# Patient Record
Sex: MALE | URBAN - METROPOLITAN AREA
[De-identification: names, ages, dates, MRNs, and addresses within clinical notes are randomized per-mention and may not be internally consistent; named-entity substitution may affect disease eponyms.]

---

## 2021-11-11 ENCOUNTER — OUTPATIENT (OUTPATIENT)
Dept: OUTPATIENT SERVICES | Facility: HOSPITAL | Age: 46
LOS: 1 days | End: 2021-11-11

## 2021-11-11 ENCOUNTER — APPOINTMENT (OUTPATIENT)
Age: 46
End: 2021-11-11
Payer: SELF-PAY

## 2021-11-11 VITALS
OXYGEN SATURATION: 97 % | DIASTOLIC BLOOD PRESSURE: 81 MMHG | TEMPERATURE: 98.6 F | HEIGHT: 66 IN | RESPIRATION RATE: 17 BRPM | BODY MASS INDEX: 28.93 KG/M2 | SYSTOLIC BLOOD PRESSURE: 126 MMHG | WEIGHT: 180 LBS | HEART RATE: 83 BPM

## 2021-11-11 DIAGNOSIS — Z00.00 ENCOUNTER FOR GENERAL ADULT MEDICAL EXAMINATION W/OUT ABNORMAL FINDINGS: ICD-10-CM

## 2021-11-11 PROCEDURE — 99203 OFFICE O/P NEW LOW 30 MIN: CPT

## 2021-11-11 NOTE — PLAN
[FreeTextEntry1] : 45 y o m with no significant pmhx, comes in for a routine check up.\par Labs today including HIV and Hep C\par Healthy eating, exercise, safe sex.

## 2021-11-11 NOTE — HEALTH RISK ASSESSMENT
[Very Good] : ~his/her~ current health as very good [] : Yes [0-4] : 0-4 [No] : No [No falls in past year] : Patient reported no falls in the past year [0] : 2) Feeling down, depressed, or hopeless: Not at all (0) [PHQ-2 Negative - No further assessment needed] : PHQ-2 Negative - No further assessment needed [de-identified] : has been smoking since age 16 [de-identified] : Quit 10/21/2012 [de-identified] : yes [HIV Test offered] : HIV Test offered [Hepatitis C test offered] : Hepatitis C test offered [Change in mental status noted] : No change in mental status noted [Language] : denies difficulty with language [None] : None [Alone] : lives alone [Employed] : employed [Less Than High School] : less than high school [Single] : single [Reports changes in hearing] : Reports no changes in hearing [Reports changes in vision] : Reports no changes in vision [Smoke Detector] : smoke detector [de-identified] : [employer] [I will adhere to the patient's wishes.] : I will adhere to the patient's wishes. [Time Spent: ___ minutes] : Time Spent: [unfilled] minutes

## 2021-11-11 NOTE — PHYSICAL EXAM
[No Acute Distress] : no acute distress [Well Nourished] : well nourished [Well Developed] : well developed [Well-Appearing] : well-appearing [Normal Sclera/Conjunctiva] : normal sclera/conjunctiva [EOMI] : extraocular movements intact [Normal Outer Ear/Nose] : the outer ears and nose were normal in appearance [Normal Oropharynx] : the oropharynx was normal [No JVD] : no jugular venous distention [No Lymphadenopathy] : no lymphadenopathy [Supple] : supple [Thyroid Normal, No Nodules] : the thyroid was normal and there were no nodules present [No Respiratory Distress] : no respiratory distress  [No Accessory Muscle Use] : no accessory muscle use [Clear to Auscultation] : lungs were clear to auscultation bilaterally [Normal Rate] : normal rate  [Regular Rhythm] : with a regular rhythm [Normal S1, S2] : normal S1 and S2 [No Murmur] : no murmur heard [No Carotid Bruits] : no carotid bruits [No Abdominal Bruit] : a ~M bruit was not heard ~T in the abdomen [No Varicosities] : no varicosities [Pedal Pulses Present] : the pedal pulses are present [No Edema] : there was no peripheral edema [No Palpable Aorta] : no palpable aorta [No Extremity Clubbing/Cyanosis] : no extremity clubbing/cyanosis [Soft] : abdomen soft [Non Tender] : non-tender [Non-distended] : non-distended [No Masses] : no abdominal mass palpated [No HSM] : no HSM [Normal Bowel Sounds] : normal bowel sounds [Normal Posterior Cervical Nodes] : no posterior cervical lymphadenopathy [Normal Anterior Cervical Nodes] : no anterior cervical lymphadenopathy [No CVA Tenderness] : no CVA  tenderness [No Spinal Tenderness] : no spinal tenderness [No Joint Swelling] : no joint swelling [Grossly Normal Strength/Tone] : grossly normal strength/tone [No Rash] : no rash [Coordination Grossly Intact] : coordination grossly intact [No Focal Deficits] : no focal deficits [Normal Gait] : normal gait [Deep Tendon Reflexes (DTR)] : deep tendon reflexes were 2+ and symmetric [Normal Affect] : the affect was normal [Normal Insight/Judgement] : insight and judgment were intact

## 2021-11-16 ENCOUNTER — APPOINTMENT (OUTPATIENT)
Age: 46
End: 2021-11-16
Payer: SELF-PAY

## 2021-11-16 LAB
ALBUMIN SERPL ELPH-MCNC: 5.1 G/DL
ALP BLD-CCNC: 63 U/L
ALT SERPL-CCNC: 29 U/L
ANION GAP SERPL CALC-SCNC: 17 MMOL/L
AST SERPL-CCNC: 25 U/L
BASOPHILS # BLD AUTO: 0.04 K/UL
BASOPHILS NFR BLD AUTO: 0.7 %
BILIRUB SERPL-MCNC: 0.6 MG/DL
BUN SERPL-MCNC: 16 MG/DL
CALCIUM SERPL-MCNC: 9.5 MG/DL
CHLORIDE SERPL-SCNC: 105 MMOL/L
CHOLEST SERPL-MCNC: 258 MG/DL
CO2 SERPL-SCNC: 18 MMOL/L
CREAT SERPL-MCNC: 0.73 MG/DL
EOSINOPHIL # BLD AUTO: 0.1 K/UL
EOSINOPHIL NFR BLD AUTO: 1.7 %
ESTIMATED AVERAGE GLUCOSE: 111 MG/DL
GLUCOSE SERPL-MCNC: 94 MG/DL
HBA1C MFR BLD HPLC: 5.5 %
HCT VFR BLD CALC: 48.9 %
HCV AB SER QL: NONREACTIVE
HCV S/CO RATIO: 0.12 S/CO
HDLC SERPL-MCNC: 44 MG/DL
HGB BLD-MCNC: 16.5 G/DL
HIV1+2 AB SPEC QL IA.RAPID: NONREACTIVE
IMM GRANULOCYTES NFR BLD AUTO: 0.2 %
LDLC SERPL CALC-MCNC: 168 MG/DL
LYMPHOCYTES # BLD AUTO: 2.66 K/UL
LYMPHOCYTES NFR BLD AUTO: 45.2 %
MAN DIFF?: NORMAL
MCHC RBC-ENTMCNC: 30.1 PG
MCHC RBC-ENTMCNC: 33.7 GM/DL
MCV RBC AUTO: 89.2 FL
MONOCYTES # BLD AUTO: 0.31 K/UL
MONOCYTES NFR BLD AUTO: 5.3 %
NEUTROPHILS # BLD AUTO: 2.76 K/UL
NEUTROPHILS NFR BLD AUTO: 46.9 %
NONHDLC SERPL-MCNC: 214 MG/DL
PLATELET # BLD AUTO: 225 K/UL
POTASSIUM SERPL-SCNC: 4.4 MMOL/L
PROT SERPL-MCNC: 7.8 G/DL
RBC # BLD: 5.48 M/UL
RBC # FLD: 12.6 %
SODIUM SERPL-SCNC: 140 MMOL/L
TRIGL SERPL-MCNC: 231 MG/DL
WBC # FLD AUTO: 5.88 K/UL

## 2021-11-19 ENCOUNTER — OUTPATIENT (OUTPATIENT)
Dept: OUTPATIENT SERVICES | Facility: HOSPITAL | Age: 46
LOS: 1 days | End: 2021-11-19

## 2021-11-19 ENCOUNTER — APPOINTMENT (OUTPATIENT)
Age: 46
End: 2021-11-19
Payer: SELF-PAY

## 2021-11-19 VITALS
TEMPERATURE: 96.7 F | HEIGHT: 66 IN | RESPIRATION RATE: 17 BRPM | DIASTOLIC BLOOD PRESSURE: 82 MMHG | WEIGHT: 180 LBS | HEART RATE: 75 BPM | OXYGEN SATURATION: 97 % | SYSTOLIC BLOOD PRESSURE: 127 MMHG | BODY MASS INDEX: 28.93 KG/M2

## 2021-11-19 PROCEDURE — 99213 OFFICE O/P EST LOW 20 MIN: CPT

## 2021-11-19 NOTE — HEALTH RISK ASSESSMENT
[Very Good] : ~his/her~ current health as very good [HIV Test offered] : HIV Test offered [Hepatitis C test offered] : Hepatitis C test offered [None] : None [Alone] : lives alone [Employed] : employed [Less Than High School] : less than high school [Single] : single [Smoke Detector] : smoke detector [Change in mental status noted] : No change in mental status noted [Language] : denies difficulty with language [Reports changes in hearing] : Reports no changes in hearing [Reports changes in vision] : Reports no changes in vision [de-identified] : [employer]

## 2021-11-19 NOTE — PLAN
[FreeTextEntry1] : 45 y o m with labs consistent with:\par \par Dyslipidemia: will start diet and exercise\par Lymphocytosis: mild, will repeat in 1-2 months\par \par Will repeat fasting lipid in 3 months and start statin if no improvement.

## 2021-11-19 NOTE — COUNSELING
[Benefits of weight loss discussed] : Benefits of weight loss discussed [Encouraged to maintain food diary] : Encouraged to maintain food diary [Encouraged to increase physical activity] : Encouraged to increase physical activity [FreeTextEntry4] : 15 [de-identified] : Low fat diet, exercise, meal plans discussed. [Patient motivation] : Patient motivation [Good understanding] : Patient has a good understanding of lifestyle changes and steps needed to achieve self management goal

## 2021-11-19 NOTE — HISTORY OF PRESENT ILLNESS
[de-identified] : 45 y o m with no significant pmhx, seen last week, comes in today for his f/u and lab result.  Labs reviewed and is consistent with dyslipidemia, slight lymphocytosis.

## 2021-11-23 ENCOUNTER — OUTPATIENT (OUTPATIENT)
Dept: OUTPATIENT SERVICES | Facility: HOSPITAL | Age: 46
LOS: 1 days | End: 2021-11-23

## 2021-11-23 ENCOUNTER — APPOINTMENT (OUTPATIENT)
Age: 46
End: 2021-11-23
Payer: SELF-PAY

## 2021-11-23 VITALS
BODY MASS INDEX: 29.38 KG/M2 | HEART RATE: 88 BPM | OXYGEN SATURATION: 97 % | DIASTOLIC BLOOD PRESSURE: 82 MMHG | TEMPERATURE: 96.7 F | RESPIRATION RATE: 16 BRPM | WEIGHT: 182 LBS | SYSTOLIC BLOOD PRESSURE: 127 MMHG

## 2021-11-23 PROCEDURE — 99213 OFFICE O/P EST LOW 20 MIN: CPT

## 2021-11-23 NOTE — PLAN
[FreeTextEntry1] : Pt is a 45 y o M with no significant PMH presenting today for completion of his chemical dependence outpatient services medical assessment form.\par  \par chemical dependence outpatient services medical assessment form\par Labs drawn for quantiferon

## 2021-11-23 NOTE — HISTORY OF PRESENT ILLNESS
[de-identified] : Mr. Brady is a 46 y/o with no significant PMH that presents today for a chemical dependence outpatient services medical assessment form. The pt has no complaints today.

## 2021-11-30 LAB
M TB IFN-G BLD-IMP: POSITIVE
QUANTIFERON TB PLUS MITOGEN MINUS NIL: 4.3 IU/ML
QUANTIFERON TB PLUS NIL: 5.2 IU/ML
QUANTIFERON TB PLUS TB1 MINUS NIL: 4.33 IU/ML
QUANTIFERON TB PLUS TB2 MINUS NIL: 4.31 IU/ML

## 2021-12-02 ENCOUNTER — APPOINTMENT (OUTPATIENT)
Age: 46
End: 2021-12-02
Payer: SELF-PAY

## 2021-12-02 DIAGNOSIS — F10.21 ALCOHOL DEPENDENCE, IN REMISSION: ICD-10-CM

## 2021-12-02 PROCEDURE — G0444 DEPRESSION SCREEN ANNUAL: CPT | Mod: 79

## 2021-12-02 PROCEDURE — 99212 OFFICE O/P EST SF 10 MIN: CPT

## 2021-12-02 PROCEDURE — G0442 ANNUAL ALCOHOL SCREEN 15 MIN: CPT | Mod: 79

## 2021-12-02 NOTE — HEALTH RISK ASSESSMENT
[1 or 2 (0 pts)] : 1 or 2 (0 points) [Never (0 pts)] : Never (0 points) [No] : In the past 12 months have you used drugs other than those required for medical reasons? No [No falls in past year] : Patient reported no falls in the past year [0] : 2) Feeling down, depressed, or hopeless: Not at all (0) [PHQ-2 Negative - No further assessment needed] : PHQ-2 Negative - No further assessment needed [] : No [de-identified] : Quit drinking in 2012 [Audit-CScore] : 0 [SCU9Ohprr] : 0

## 2021-12-02 NOTE — PLAN
[FreeTextEntry1] : 1. Positive quantiferon with negative CXR in past 3 yrs and asymptomatic, repeat quantiferon next year and if symptomatic will need another CXR otherwise not needed 5-10 years.\par 2. Hyperlipidemia will need diet and lifestyle changes and repeat upon return from travels in about 3 months. If still elevated will need to start a statin.\par 3. Alcoholism in recovery - continue current management.

## 2021-12-02 NOTE — HISTORY OF PRESENT ILLNESS
[FreeTextEntry1] : 45 year old who came for follow up regarding annual medical clearance for current substance program. No physical complaints. [de-identified] : Patient with a history of positive quantiferon with negative xrays (last one in 2018) with no current complaints. Traveling abroad next week. Came to complete paperwork.

## 2021-12-02 NOTE — PHYSICAL EXAM
[No Acute Distress] : no acute distress [Well Nourished] : well nourished [Well Developed] : well developed [Well-Appearing] : well-appearing [Normal Sclera/Conjunctiva] : normal sclera/conjunctiva [PERRL] : pupils equal round and reactive to light [EOMI] : extraocular movements intact [Normal Outer Ear/Nose] : the outer ears and nose were normal in appearance [Supple] : supple [No Respiratory Distress] : no respiratory distress  [No Accessory Muscle Use] : no accessory muscle use [Grossly Normal Strength/Tone] : grossly normal strength/tone [Coordination Grossly Intact] : coordination grossly intact [No Focal Deficits] : no focal deficits [Normal Gait] : normal gait [Speech Grossly Normal] : speech grossly normal [Memory Grossly Normal] : memory grossly normal [Normal Affect] : the affect was normal [Alert and Oriented x3] : oriented to person, place, and time [Normal Mood] : the mood was normal [Normal Insight/Judgement] : insight and judgment were intact

## 2022-02-18 ENCOUNTER — OUTPATIENT (OUTPATIENT)
Dept: OUTPATIENT SERVICES | Facility: HOSPITAL | Age: 47
LOS: 1 days | End: 2022-02-18

## 2022-02-18 ENCOUNTER — APPOINTMENT (OUTPATIENT)
Age: 47
End: 2022-02-18
Payer: MEDICAID

## 2022-02-18 VITALS
WEIGHT: 184 LBS | HEART RATE: 86 BPM | BODY MASS INDEX: 29.7 KG/M2 | RESPIRATION RATE: 16 BRPM | TEMPERATURE: 98.7 F | SYSTOLIC BLOOD PRESSURE: 111 MMHG | OXYGEN SATURATION: 95 % | DIASTOLIC BLOOD PRESSURE: 67 MMHG

## 2022-02-18 DIAGNOSIS — Z11.1 ENCOUNTER FOR SCREENING FOR RESPIRATORY TUBERCULOSIS: ICD-10-CM

## 2022-02-18 PROCEDURE — 99213 OFFICE O/P EST LOW 20 MIN: CPT

## 2022-03-15 ENCOUNTER — OUTPATIENT (OUTPATIENT)
Dept: OUTPATIENT SERVICES | Facility: HOSPITAL | Age: 47
LOS: 1 days | End: 2022-03-15

## 2022-03-15 ENCOUNTER — APPOINTMENT (OUTPATIENT)
Age: 47
End: 2022-03-15
Payer: MEDICAID

## 2022-03-15 VITALS
OXYGEN SATURATION: 97 % | TEMPERATURE: 97 F | SYSTOLIC BLOOD PRESSURE: 114 MMHG | WEIGHT: 178 LBS | RESPIRATION RATE: 17 BRPM | BODY MASS INDEX: 28.73 KG/M2 | DIASTOLIC BLOOD PRESSURE: 75 MMHG | HEART RATE: 83 BPM

## 2022-03-15 PROBLEM — Z11.1 SCREENING-PULMONARY TB: Status: RESOLVED | Noted: 2021-11-23 | Resolved: 2022-03-15

## 2022-03-15 LAB
BASOPHILS # BLD AUTO: 0.03 K/UL
BASOPHILS NFR BLD AUTO: 0.6 %
EOSINOPHIL # BLD AUTO: 0.11 K/UL
EOSINOPHIL NFR BLD AUTO: 2.2 %
HCT VFR BLD CALC: 44.1 %
HGB BLD-MCNC: 14.9 G/DL
IMM GRANULOCYTES NFR BLD AUTO: 0 %
LYMPHOCYTES # BLD AUTO: 2.17 K/UL
LYMPHOCYTES NFR BLD AUTO: 43.3 %
MAN DIFF?: NORMAL
MCHC RBC-ENTMCNC: 29.9 PG
MCHC RBC-ENTMCNC: 33.8 GM/DL
MCV RBC AUTO: 88.6 FL
MONOCYTES # BLD AUTO: 0.26 K/UL
MONOCYTES NFR BLD AUTO: 5.2 %
NEUTROPHILS # BLD AUTO: 2.44 K/UL
NEUTROPHILS NFR BLD AUTO: 48.7 %
PLATELET # BLD AUTO: 206 K/UL
RBC # BLD: 4.98 M/UL
RBC # FLD: 13.4 %
WBC # FLD AUTO: 5.01 K/UL

## 2022-03-15 PROCEDURE — 99213 OFFICE O/P EST LOW 20 MIN: CPT

## 2022-03-15 NOTE — HISTORY OF PRESENT ILLNESS
[de-identified] : 46 y o m with PMH hyperlipidemia, acquired lymphocytosis, comes in today for a f/u for fasting lipid panel. He has no complaints.

## 2022-03-15 NOTE — PLAN
[FreeTextEntry1] : Pt is a 46 y o M with history of positive quantiferon test presenting today for follow up of labs. No complaints. BP today 111/67.\par \par 1. Positive Quantiferon Test\par - Test positive Nov 23, 2022. Patient has remote history of positive Quantiferon tests with negative CXRs, which the patient did not disclose at the time\par - CXR WNL December 2021\par - No further management needed\par \par 2. Dyslipidemia \par - Patient's calculated ASCVD risk score is 3.6%\par - Will follow up with lipid profile in March 2022\par - Dietary and exercise counseling provided\par \par  \par

## 2022-03-15 NOTE — HISTORY OF PRESENT ILLNESS
[de-identified] : 46 y o m with h/o hyperlipidemia, acquired lymphocytosis, history of positive QFT but negative CXR, comes in today for a f/u.  He has no complaints.

## 2022-03-15 NOTE — PLAN
[FreeTextEntry1] : Pt is a 46 y o M with no significant PMH presenting today for completion of his fasting blood work. No complaints. BP today 114/75.\par \par 1. Dyslipidemia \par - Lipid profile not in range in November 2021. . Total 258. HDL 44. \par - Patient's calculated ASCVD risk score is 3.6%\par - Repeat lipid profile today and f/u \par - Dietary counseling provided\par \par 2. Mild Lymphocytosis noted in previous CBC\par - Repeat CBC today\par \par \par  \par

## 2022-03-17 DIAGNOSIS — E78.5 HYPERLIPIDEMIA, UNSPECIFIED: ICD-10-CM

## 2022-03-17 DIAGNOSIS — D72.820 LYMPHOCYTOSIS (SYMPTOMATIC): ICD-10-CM

## 2022-03-18 LAB
ALBUMIN SERPL ELPH-MCNC: 4.5 G/DL
ALP BLD-CCNC: 69 U/L
ALT SERPL-CCNC: 27 U/L
ANION GAP SERPL CALC-SCNC: 15 MMOL/L
AST SERPL-CCNC: 23 U/L
BILIRUB SERPL-MCNC: 0.3 MG/DL
BUN SERPL-MCNC: 14 MG/DL
CALCIUM SERPL-MCNC: 8.8 MG/DL
CHLORIDE SERPL-SCNC: 107 MMOL/L
CHOLEST SERPL-MCNC: 207 MG/DL
CO2 SERPL-SCNC: 19 MMOL/L
CREAT SERPL-MCNC: 0.66 MG/DL
EGFR: 116 ML/MIN/1.73M2
GLUCOSE SERPL-MCNC: 93 MG/DL
HDLC SERPL-MCNC: 41 MG/DL
LDLC SERPL CALC-MCNC: 128 MG/DL
NONHDLC SERPL-MCNC: 166 MG/DL
POTASSIUM SERPL-SCNC: 4.3 MMOL/L
PROT SERPL-MCNC: 6.9 G/DL
SODIUM SERPL-SCNC: 141 MMOL/L
TRIGL SERPL-MCNC: 190 MG/DL

## 2022-05-24 ENCOUNTER — OUTPATIENT (OUTPATIENT)
Dept: OUTPATIENT SERVICES | Facility: HOSPITAL | Age: 47
LOS: 1 days | End: 2022-05-24

## 2022-05-24 ENCOUNTER — APPOINTMENT (OUTPATIENT)
Age: 47
End: 2022-05-24
Payer: MEDICAID

## 2022-05-24 VITALS
OXYGEN SATURATION: 97 % | TEMPERATURE: 97.2 F | WEIGHT: 173 LBS | DIASTOLIC BLOOD PRESSURE: 71 MMHG | SYSTOLIC BLOOD PRESSURE: 118 MMHG | HEART RATE: 67 BPM | RESPIRATION RATE: 15 BRPM | BODY MASS INDEX: 27.92 KG/M2

## 2022-05-24 PROCEDURE — 99213 OFFICE O/P EST LOW 20 MIN: CPT

## 2022-05-24 NOTE — PLAN
[FreeTextEntry1] : 46 y o m with hyperlipidemia, comes in for his lab result. \par Labs reviewed, lipids are decreasing.\par Continue diet and exercise, mostly walking.\par \par BMI 27, decreased from 29.\par Continue healthy eating and exercise.\par \par RTC 3 months

## 2022-05-31 DIAGNOSIS — E78.5 HYPERLIPIDEMIA, UNSPECIFIED: ICD-10-CM

## 2022-09-22 ENCOUNTER — APPOINTMENT (OUTPATIENT)
Age: 47
End: 2022-09-22

## 2022-09-27 ENCOUNTER — APPOINTMENT (OUTPATIENT)
Age: 47
End: 2022-09-27

## 2022-09-27 ENCOUNTER — OUTPATIENT (OUTPATIENT)
Dept: OUTPATIENT SERVICES | Facility: HOSPITAL | Age: 47
LOS: 1 days | End: 2022-09-27

## 2022-09-27 PROCEDURE — 99203 OFFICE O/P NEW LOW 30 MIN: CPT

## 2022-09-30 DIAGNOSIS — E78.5 HYPERLIPIDEMIA, UNSPECIFIED: ICD-10-CM

## 2022-09-30 LAB
CHOLEST SERPL-MCNC: 213 MG/DL
HDLC SERPL-MCNC: 40 MG/DL
LDLC SERPL CALC-MCNC: 139 MG/DL
NONHDLC SERPL-MCNC: 174 MG/DL
TRIGL SERPL-MCNC: 174 MG/DL

## 2022-09-30 NOTE — HISTORY OF PRESENT ILLNESS
[FreeTextEntry1] : 46 y o m with h/o hyperlipidemia, comes in for a fasting lipid. He has no complaints today.

## 2022-09-30 NOTE — PLAN
[FreeTextEntry1] : 46 y.o. m comes in for fasting lipid profile:\par \par Hyperlipidemia: Fasting lipid profile done. Advised to continue low fat diet.\par Continue current management.\par \par Healthy eating, exercise, safe sex advised.\par \par Will f/u in 1 week for lab results.

## 2022-10-04 ENCOUNTER — APPOINTMENT (OUTPATIENT)
Age: 47
End: 2022-10-04
Payer: MEDICAID

## 2022-10-04 ENCOUNTER — APPOINTMENT (OUTPATIENT)
Age: 47
End: 2022-10-04

## 2022-10-04 VITALS
HEART RATE: 71 BPM | BODY MASS INDEX: 27.8 KG/M2 | HEIGHT: 66 IN | WEIGHT: 173 LBS | DIASTOLIC BLOOD PRESSURE: 72 MMHG | SYSTOLIC BLOOD PRESSURE: 115 MMHG

## 2022-10-04 PROCEDURE — XXXXX: CPT | Mod: 1L

## 2022-10-07 LAB
CHOLEST SERPL-MCNC: 249 MG/DL
HDLC SERPL-MCNC: 44 MG/DL
LDLC SERPL CALC-MCNC: 168 MG/DL
NONHDLC SERPL-MCNC: 205 MG/DL
TRIGL SERPL-MCNC: 184 MG/DL

## 2022-10-13 ENCOUNTER — APPOINTMENT (OUTPATIENT)
Age: 47
End: 2022-10-13
Payer: MEDICAID

## 2022-10-13 VITALS
SYSTOLIC BLOOD PRESSURE: 113 MMHG | TEMPERATURE: 97.3 F | HEIGHT: 66 IN | HEART RATE: 66 BPM | WEIGHT: 173 LBS | DIASTOLIC BLOOD PRESSURE: 71 MMHG | BODY MASS INDEX: 27.8 KG/M2

## 2022-10-13 DIAGNOSIS — D72.820 LYMPHOCYTOSIS (SYMPTOMATIC): ICD-10-CM

## 2022-10-13 PROCEDURE — 99213 OFFICE O/P EST LOW 20 MIN: CPT

## 2022-10-13 RX ORDER — SIMVASTATIN 20 MG/1
20 TABLET, FILM COATED ORAL
Qty: 30 | Refills: 3 | Status: ACTIVE | COMMUNITY
Start: 2022-10-13 | End: 1900-01-01

## 2023-01-13 ENCOUNTER — APPOINTMENT (OUTPATIENT)
Age: 48
End: 2023-01-13

## 2023-04-04 PROBLEM — D72.820 ACQUIRED LYMPHOCYTOSIS: Status: ACTIVE | Noted: 2021-11-19

## 2023-04-04 NOTE — PLAN
[FreeTextEntry1] : 46 y o m for lab f/u.\par \par Labs discussed, ASCDD score 8.2\par Sample given an Rx sent Simvastatin 20 mg daily\par \par Acquired lymphocytosis: now resolved.\par \par Healthy eating, exercise, safe sex.\par \par RTC 3 months\par \par

## 2023-04-04 NOTE — HISTORY OF PRESENT ILLNESS
[FreeTextEntry1] : For lab result. [de-identified] : 46 y o m with lymphocytosis, hyperlipidemia, comes in for his lab result. He feels great, has no complaints.

## 2023-05-16 NOTE — PLAN
[FreeTextEntry1] : 46 y.o. m comes in for fasting lipid profile:\par \par Dyslipidemia: Lipid profile done. Patient advised to continue low carb and salt diet.\par \par Healthy eating, exercise, safe sex advised.\par \par Will f/u in 2 weeks for lab results.

## 2023-05-16 NOTE — HISTORY OF PRESENT ILLNESS
[FreeTextEntry1] : 46 y o m with h/o hyperlipidemia, comes in for a fasting lipid. Pt states he has not been drinking since April 2022.

## 2024-05-03 ENCOUNTER — APPOINTMENT (OUTPATIENT)
Age: 49
End: 2024-05-03
Payer: COMMERCIAL

## 2024-05-03 ENCOUNTER — OUTPATIENT (OUTPATIENT)
Dept: OUTPATIENT SERVICES | Facility: HOSPITAL | Age: 49
LOS: 1 days | End: 2024-05-03

## 2024-05-03 ENCOUNTER — LABORATORY RESULT (OUTPATIENT)
Age: 49
End: 2024-05-03

## 2024-05-03 VITALS
SYSTOLIC BLOOD PRESSURE: 100 MMHG | BODY MASS INDEX: 27.44 KG/M2 | HEART RATE: 64 BPM | OXYGEN SATURATION: 98 % | RESPIRATION RATE: 14 BRPM | DIASTOLIC BLOOD PRESSURE: 60 MMHG | WEIGHT: 170 LBS

## 2024-05-03 DIAGNOSIS — E78.5 HYPERLIPIDEMIA, UNSPECIFIED: ICD-10-CM

## 2024-05-03 DIAGNOSIS — Z12.11 ENCOUNTER FOR SCREENING FOR MALIGNANT NEOPLASM OF COLON: ICD-10-CM

## 2024-05-03 DIAGNOSIS — F17.210 NICOTINE DEPENDENCE, CIGARETTES, UNCOMPLICATED: ICD-10-CM

## 2024-05-03 DIAGNOSIS — Z82.49 FAMILY HISTORY OF ISCHEMIC HEART DISEASE AND OTHER DISEASES OF THE CIRCULATORY SYSTEM: ICD-10-CM

## 2024-05-03 DIAGNOSIS — Z00.00 ENCOUNTER FOR GENERAL ADULT MEDICAL EXAMINATION W/OUT ABNORMAL FINDINGS: ICD-10-CM

## 2024-05-03 PROCEDURE — 99396 PREV VISIT EST AGE 40-64: CPT

## 2024-05-03 NOTE — HEALTH RISK ASSESSMENT
[Very Good] : ~his/her~ current health as very good [Good] : ~his/her~  mood as  good [1 or 2 (0 pts)] : 1 or 2 (0 points) [Never (0 pts)] : Never (0 points) [No] : In the past 12 months have you used drugs other than those required for medical reasons? No [No falls in past year] : Patient reported no falls in the past year [Little interest or pleasure doing things] : 1) Little interest or pleasure doing things [Feeling down, depressed, or hopeless] : 2) Feeling down, depressed, or hopeless [0] : 2) Feeling down, depressed, or hopeless: Not at all (0) [PHQ-2 Negative - No further assessment needed] : PHQ-2 Negative - No further assessment needed [HIV Test offered] : HIV Test offered [Hepatitis C test offered] : Hepatitis C test offered [None] : None [Alone] : lives alone [Employed] : employed [] :  [# Of Children ___] : has [unfilled] children [Feels Safe at Home] : Feels safe at home [Fully functional (bathing, dressing, toileting, transferring, walking, feeding)] : Fully functional (bathing, dressing, toileting, transferring, walking, feeding) [Fully functional (using the telephone, shopping, preparing meals, housekeeping, doing laundry, using] : Fully functional and needs no help or supervision to perform IADLs (using the telephone, shopping, preparing meals, housekeeping, doing laundry, using transportation, managing medications and managing finances) [Smoke Detector] : smoke detector [Carbon Monoxide Detector] : carbon monoxide detector [FreeTextEntry1] : None [de-identified] : None [de-identified] : None [Audit-CScore] : 0 [de-identified] : Not much exercises [de-identified] : Eats everything [HGT9Ycdkh] : 0 [Change in mental status noted] : No change in mental status noted [Behavior] : denies difficulty with behavior [Language] : denies difficulty with language [Learning/Retaining New Information] : denies difficulty learning/retaining new information [Reasoning] : denies difficulty with reasoning [Less Than High School] : less than high school [Sexually Active] : not sexually active [High Risk Behavior] : no high risk behavior [Reports changes in hearing] : Reports no changes in hearing [Reports changes in vision] : Reports no changes in vision [Reports changes in dental health] : Reports no changes in dental health [Guns at Home] : no guns at home [Sunscreen] : does not use sunscreen [TB Exposure] : is not being exposed to tuberculosis [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

## 2024-05-03 NOTE — PLAN
[FreeTextEntry1] : 48 y o m for MAT program form completion. Severe alcohol use disorder in sustained remission. Form started, awaiting lab results. Active cigarettes smoker: smoking cessation counseling. FIT kit for colon cancer screening. H/o hyperlipidemia: Lipids today. H/o positive QuantiFERON 11/2021 with normal chest x-ray. Healthy eating, exercise, safe sex. RTC 1 week for result.

## 2024-05-07 LAB
ALBUMIN SERPL ELPH-MCNC: 4.7 G/DL
ALP BLD-CCNC: 62 U/L
ALT SERPL-CCNC: 32 U/L
AMPHET UR-MCNC: NEGATIVE NG/ML
ANION GAP SERPL CALC-SCNC: 12 MMOL/L
AST SERPL-CCNC: 32 U/L
BARBITURATES UR-MCNC: NEGATIVE NG/ML
BENZODIAZ UR-MCNC: NEGATIVE NG/ML
BILIRUB DIRECT SERPL-MCNC: 0.1 MG/DL
BILIRUB INDIRECT SERPL-MCNC: 0.4 MG/DL
BILIRUB SERPL-MCNC: 0.5 MG/DL
BUN SERPL-MCNC: 19 MG/DL
C TRACH RRNA SPEC QL NAA+PROBE: NOT DETECTED
CALCIUM SERPL-MCNC: 8.9 MG/DL
CHLORIDE SERPL-SCNC: 102 MMOL/L
CHOLEST SERPL-MCNC: 216 MG/DL
CO2 SERPL-SCNC: 22 MMOL/L
COCAINE METAB.OTHER UR-MCNC: NEGATIVE NG/ML
CREAT SERPL-MCNC: 0.67 MG/DL
CREATININE, URINE: 52.3 MG/DL
EGFR: 114 ML/MIN/1.73M2
FENTANYL, URINE: NEGATIVE NG/ML
GLUCOSE SERPL-MCNC: 90 MG/DL
HBV SURFACE AB SER QL: REACTIVE
HBV SURFACE AG SER QL: NONREACTIVE
HCT VFR BLD CALC: 45.4 %
HCV AB SER QL: NONREACTIVE
HCV S/CO RATIO: 0.11 S/CO
HDLC SERPL-MCNC: 48 MG/DL
HEPATITIS A IGG ANTIBODY: REACTIVE
HGB BLD-MCNC: 15 G/DL
HIV1+2 AB SPEC QL IA.RAPID: NONREACTIVE
LDLC SERPL CALC-MCNC: 148 MG/DL
MCHC RBC-ENTMCNC: 30.6 PG
MCHC RBC-ENTMCNC: 33 GM/DL
MCV RBC AUTO: 92.7 FL
METHADONE SCREEN, UR: NEGATIVE NG/ML
N GONORRHOEA RRNA SPEC QL NAA+PROBE: NOT DETECTED
NONHDLC SERPL-MCNC: 168 MG/DL
OPIATES UR-MCNC: NEGATIVE NG/ML
OXYCODONE/OXYMORPHONE, URINE: NEGATIVE NG/ML
PCP UR-MCNC: NEGATIVE NG/ML
PH, URINE: 6.1
PLATELET # BLD AUTO: 233 K/UL
POTASSIUM SERPL-SCNC: 4.2 MMOL/L
PROT SERPL-MCNC: 7.3 G/DL
RBC # BLD: 4.9 M/UL
RBC # FLD: 13.2 %
SODIUM SERPL-SCNC: 136 MMOL/L
SOURCE TP AMPLIFICATION: NORMAL
T PALLIDUM AB SER QL IA: NEGATIVE
THC UR QL: NEGATIVE NG/ML
TRIGL SERPL-MCNC: 107 MG/DL
WBC # FLD AUTO: 5.19 K/UL

## 2024-05-10 ENCOUNTER — APPOINTMENT (OUTPATIENT)
Age: 49
End: 2024-05-10
Payer: COMMERCIAL

## 2024-05-10 ENCOUNTER — OUTPATIENT (OUTPATIENT)
Dept: OUTPATIENT SERVICES | Facility: HOSPITAL | Age: 49
LOS: 1 days | End: 2024-05-10

## 2024-05-10 VITALS
OXYGEN SATURATION: 97 % | BODY MASS INDEX: 27.44 KG/M2 | DIASTOLIC BLOOD PRESSURE: 65 MMHG | WEIGHT: 170 LBS | HEART RATE: 74 BPM | SYSTOLIC BLOOD PRESSURE: 125 MMHG | RESPIRATION RATE: 14 BRPM

## 2024-05-10 DIAGNOSIS — E78.5 HYPERLIPIDEMIA, UNSPECIFIED: ICD-10-CM

## 2024-05-10 DIAGNOSIS — R76.12 NONSPECIFIC REACTION TO CELL MEDIATED IMMUNITY MEASUREMENT OF GAMMA INTERFERON ANTIGEN RESPONSE W/OUT ACTIVE TUBERCULOSIS: ICD-10-CM

## 2024-05-10 DIAGNOSIS — F10.21 ALCOHOL DEPENDENCE, IN REMISSION: ICD-10-CM

## 2024-05-10 DIAGNOSIS — F17.210 NICOTINE DEPENDENCE, CIGARETTES, UNCOMPLICATED: ICD-10-CM

## 2024-05-10 LAB
MEV IGG FLD QL IA: 139 AU/ML
MEV IGG+IGM SER-IMP: POSITIVE
MUV AB SER-ACNC: POSITIVE
MUV IGG SER QL IA: 36.7 AU/ML
RUBV IGG FLD-ACNC: 4.2 INDEX
RUBV IGG SER-IMP: POSITIVE
VZV AB TITR SER: POSITIVE
VZV IGG SER IF-ACNC: 818.7 INDEX

## 2024-05-10 PROCEDURE — 99213 OFFICE O/P EST LOW 20 MIN: CPT

## 2024-05-10 NOTE — PHYSICAL EXAM
[No Acute Distress] : no acute distress [Well Nourished] : well nourished [Well Developed] : well developed [Speech Grossly Normal] : speech grossly normal [Memory Grossly Normal] : memory grossly normal [Normal Affect] : the affect was normal [Normal] : affect was normal and insight and judgment were intact

## 2024-05-10 NOTE — HISTORY OF PRESENT ILLNESS
[de-identified] : 48 y o m comes in for his lab result and completion of form for MAT. He has no complaints.

## 2024-05-10 NOTE — PLAN
[FreeTextEntry1] : 48 y o m comes in for his lab result and to finish the MAT program form completion. Labs reviewed, lipids still not at target. Low fat diet and exercise. Active cigarette smoker: Smoking cessation counseling. Hep A positive: Reassurance, normal LFTs. MAT form completed. Alcohol use disorder in sustained remission. Pt refused Naltrexone therapy stating he has not been drinking in 12 years and doesn't need anything. H/o positive PPD with normal chest x-ray 2018 Healthy eating, exercise, safe sex.

## 2024-05-13 DIAGNOSIS — F10.21 ALCOHOL DEPENDENCE, IN REMISSION: ICD-10-CM

## 2024-05-13 DIAGNOSIS — Z00.00 ENCOUNTER FOR GENERAL ADULT MEDICAL EXAMINATION WITHOUT ABNORMAL FINDINGS: ICD-10-CM

## 2024-05-13 DIAGNOSIS — F17.210 NICOTINE DEPENDENCE, CIGARETTES, UNCOMPLICATED: ICD-10-CM

## 2024-05-13 DIAGNOSIS — E78.5 HYPERLIPIDEMIA, UNSPECIFIED: ICD-10-CM

## 2024-05-13 DIAGNOSIS — R76.12 NONSPECIFIC REACTION TO CELL MEDIATED IMMUNITY MEASUREMENT OF GAMMA INTERFERON ANTIGEN RESPONSE WITHOUT ACTIVE TUBERCULOSIS: ICD-10-CM

## 2024-05-13 DIAGNOSIS — Z82.49 FAMILY HISTORY OF ISCHEMIC HEART DISEASE AND OTHER DISEASES OF THE CIRCULATORY SYSTEM: ICD-10-CM

## 2024-09-16 ENCOUNTER — APPOINTMENT (OUTPATIENT)
Age: 49
End: 2024-09-16

## 2024-09-16 VITALS
HEART RATE: 67 BPM | SYSTOLIC BLOOD PRESSURE: 102 MMHG | DIASTOLIC BLOOD PRESSURE: 62 MMHG | WEIGHT: 176 LBS | OXYGEN SATURATION: 97 % | TEMPERATURE: 97.6 F | BODY MASS INDEX: 28.28 KG/M2 | HEIGHT: 66 IN

## 2024-09-16 DIAGNOSIS — E78.5 HYPERLIPIDEMIA, UNSPECIFIED: ICD-10-CM

## 2024-09-16 DIAGNOSIS — F17.210 NICOTINE DEPENDENCE, CIGARETTES, UNCOMPLICATED: ICD-10-CM

## 2024-09-16 PROCEDURE — 99203 OFFICE O/P NEW LOW 30 MIN: CPT

## 2024-09-16 NOTE — COUNSELING
[Yes] : Risk of tobacco use and health benefits of smoking cessation discussed: Yes [Cessation strategies including cessation program discussed] : Cessation strategies including cessation program discussed [No] : Not willing to quit smoking [Encouraged to increase physical activity] : Encouraged to increase physical activity [Needs reinforcement, provided] : Patient needs reinforcement on understanding of disease, goals and obesity follow-up plan; reinforcement was provided [FreeTextEntry1] : 7 [FreeTextEntry2] : proper dieting discussed to help lower cholesterol [de-identified] : quit smoking, eat healthier [FreeTextEntry4] : 15

## 2024-09-16 NOTE — HISTORY OF PRESENT ILLNESS
[FreeTextEntry8] : 47 yo M with pmh nicotine dependence, HLD who presents for follow up of HLD. States has not changed diet but has been more active. Still smokes 1 pack per week. States FH+ father who recently had CABG. Denies any other acute complaints at this time,

## 2024-09-16 NOTE — PLAN
[FreeTextEntry1] : #HLD-healthier eating, check lipid profile, TSH with rt4 and a1c #nicotine dependence-adivsed to quit and decrease cigarette use

## 2024-09-17 ENCOUNTER — OUTPATIENT (OUTPATIENT)
Dept: OUTPATIENT SERVICES | Facility: HOSPITAL | Age: 49
LOS: 1 days | End: 2024-09-17

## 2024-09-17 LAB
ALBUMIN SERPL ELPH-MCNC: 4.5 G/DL
ALP BLD-CCNC: 65 U/L
ALT SERPL-CCNC: 37 U/L
ANION GAP SERPL CALC-SCNC: 11 MMOL/L
AST SERPL-CCNC: 37 U/L
BILIRUB SERPL-MCNC: 0.5 MG/DL
BUN SERPL-MCNC: 16 MG/DL
CALCIUM SERPL-MCNC: 8.9 MG/DL
CHLORIDE SERPL-SCNC: 105 MMOL/L
CHOLEST SERPL-MCNC: 246 MG/DL
CO2 SERPL-SCNC: 20 MMOL/L
CREAT SERPL-MCNC: 0.58 MG/DL
EGFR: 120 ML/MIN/1.73M2
ESTIMATED AVERAGE GLUCOSE: 114 MG/DL
GLUCOSE SERPL-MCNC: 87 MG/DL
HBA1C MFR BLD HPLC: 5.6 %
HDLC SERPL-MCNC: 47 MG/DL
LDLC SERPL CALC-MCNC: 174 MG/DL
NONHDLC SERPL-MCNC: 198 MG/DL
POTASSIUM SERPL-SCNC: 4.3 MMOL/L
PROT SERPL-MCNC: 7.2 G/DL
SODIUM SERPL-SCNC: 135 MMOL/L
TRIGL SERPL-MCNC: 135 MG/DL
TSH SERPL-ACNC: 0.63 UIU/ML

## 2024-09-23 ENCOUNTER — OUTPATIENT (OUTPATIENT)
Dept: OUTPATIENT SERVICES | Facility: HOSPITAL | Age: 49
LOS: 1 days | End: 2024-09-23

## 2024-09-23 ENCOUNTER — APPOINTMENT (OUTPATIENT)
Age: 49
End: 2024-09-23

## 2024-09-23 VITALS
SYSTOLIC BLOOD PRESSURE: 109 MMHG | WEIGHT: 176 LBS | RESPIRATION RATE: 14 BRPM | BODY MASS INDEX: 28.41 KG/M2 | OXYGEN SATURATION: 97 % | HEART RATE: 70 BPM | DIASTOLIC BLOOD PRESSURE: 65 MMHG

## 2024-09-23 PROCEDURE — 99212 OFFICE O/P EST SF 10 MIN: CPT

## 2024-09-23 RX ORDER — SIMVASTATIN 20 MG/1
20 TABLET, FILM COATED ORAL
Qty: 90 | Refills: 1 | Status: ACTIVE | COMMUNITY
Start: 2024-09-23 | End: 1900-01-01

## 2024-09-23 NOTE — HISTORY OF PRESENT ILLNESS
[FreeTextEntry1] : 49 y/o pt here for follow up of lipid panel and HgA1C.   [de-identified] : 49 y/o pt with PmHx HLD, pre DM here for follow up of lipid panel and HgA1C.  Pt continues to smoke 1 packet/week.  He denies ETOH abuse.

## 2024-09-23 NOTE — COUNSELING
[Yes] : Risk of tobacco use and health benefits of smoking cessation discussed: Yes [No] : Not willing to quit smoking [FreeTextEntry1] : 7 [Encouraged to increase physical activity] : Encouraged to increase physical activity [Patient motivation] : Patient motivation [Needs reinforcement, provided] : Patient needs reinforcement on understanding of lifestyle changes and steps needed to achieve self management goal; reinforcement was provided

## 2024-09-23 NOTE — PLAN
[FreeTextEntry1] : Pt counseled to exercise regularly and quit smoking.  He was advised of his pre-DM and elevated lipid panel,  numbers explained to pt.   He has not been on cholesterol meds in 2 years.   LFTS wnl.  - pt to take Fish oil - script sent for lipitor 20mg x 90 day supply

## 2025-06-05 ENCOUNTER — APPOINTMENT (OUTPATIENT)
Age: 50
End: 2025-06-05
Payer: MEDICAID

## 2025-06-05 ENCOUNTER — OUTPATIENT (OUTPATIENT)
Dept: OUTPATIENT SERVICES | Facility: HOSPITAL | Age: 50
LOS: 1 days | End: 2025-06-05

## 2025-06-05 VITALS
TEMPERATURE: 98.4 F | WEIGHT: 173 LBS | RESPIRATION RATE: 15 BRPM | OXYGEN SATURATION: 99 % | SYSTOLIC BLOOD PRESSURE: 98 MMHG | DIASTOLIC BLOOD PRESSURE: 58 MMHG | HEART RATE: 61 BPM | BODY MASS INDEX: 27.92 KG/M2

## 2025-06-05 DIAGNOSIS — Z13.1 ENCOUNTER FOR SCREENING FOR DIABETES MELLITUS: ICD-10-CM

## 2025-06-05 PROCEDURE — 99214 OFFICE O/P EST MOD 30 MIN: CPT

## 2025-06-11 ENCOUNTER — OUTPATIENT (OUTPATIENT)
Dept: OUTPATIENT SERVICES | Facility: HOSPITAL | Age: 50
LOS: 1 days | End: 2025-06-11

## 2025-06-11 ENCOUNTER — APPOINTMENT (OUTPATIENT)
Age: 50
End: 2025-06-11
Payer: MEDICAID

## 2025-06-11 LAB
ALBUMIN SERPL ELPH-MCNC: 4.4 G/DL
ALP BLD-CCNC: 63 U/L
ALT SERPL-CCNC: 42 U/L
ANION GAP SERPL CALC-SCNC: 13 MMOL/L
AST SERPL-CCNC: 29 U/L
BASOPHILS # BLD AUTO: 0.04 K/UL
BASOPHILS NFR BLD AUTO: 0.6 %
BILIRUB SERPL-MCNC: 0.5 MG/DL
BUN SERPL-MCNC: 17 MG/DL
CALCIUM SERPL-MCNC: 8.9 MG/DL
CHLORIDE SERPL-SCNC: 107 MMOL/L
CHOLEST SERPL-MCNC: 220 MG/DL
CO2 SERPL-SCNC: 21 MMOL/L
CREAT SERPL-MCNC: 0.71 MG/DL
EGFRCR SERPLBLD CKD-EPI 2021: 112 ML/MIN/1.73M2
EOSINOPHIL # BLD AUTO: 0.12 K/UL
EOSINOPHIL NFR BLD AUTO: 1.9 %
ESTIMATED AVERAGE GLUCOSE: 123 MG/DL
GLUCOSE SERPL-MCNC: 97 MG/DL
HBA1C MFR BLD HPLC: 5.9 %
HCT VFR BLD CALC: 44 %
HDLC SERPL-MCNC: 43 MG/DL
HGB BLD-MCNC: 14.6 G/DL
IMM GRANULOCYTES NFR BLD AUTO: 0.3 %
LDLC SERPL-MCNC: 152 MG/DL
LYMPHOCYTES # BLD AUTO: 2.16 K/UL
LYMPHOCYTES NFR BLD AUTO: 34.8 %
MAN DIFF?: NORMAL
MCHC RBC-ENTMCNC: 29.4 PG
MCHC RBC-ENTMCNC: 33.2 G/DL
MCV RBC AUTO: 88.7 FL
MONOCYTES # BLD AUTO: 0.35 K/UL
MONOCYTES NFR BLD AUTO: 5.6 %
NEUTROPHILS # BLD AUTO: 3.51 K/UL
NEUTROPHILS NFR BLD AUTO: 56.8 %
NONHDLC SERPL-MCNC: 177 MG/DL
PLATELET # BLD AUTO: 224 K/UL
POTASSIUM SERPL-SCNC: 4.1 MMOL/L
PROT SERPL-MCNC: 6.9 G/DL
RBC # BLD: 4.96 M/UL
RBC # FLD: 13.4 %
SODIUM SERPL-SCNC: 141 MMOL/L
TRIGL SERPL-MCNC: 138 MG/DL
WBC # FLD AUTO: 6.2 K/UL

## 2025-06-11 PROCEDURE — 99213 OFFICE O/P EST LOW 20 MIN: CPT

## 2025-06-11 RX ORDER — SIMVASTATIN 40 MG/1
40 TABLET, FILM COATED ORAL
Qty: 90 | Refills: 0 | Status: ACTIVE | COMMUNITY
Start: 2025-06-11 | End: 1900-01-01

## 2025-06-12 DIAGNOSIS — E78.5 HYPERLIPIDEMIA, UNSPECIFIED: ICD-10-CM

## 2025-06-12 DIAGNOSIS — Z13.1 ENCOUNTER FOR SCREENING FOR DIABETES MELLITUS: ICD-10-CM

## 2025-06-12 DIAGNOSIS — F17.210 NICOTINE DEPENDENCE, CIGARETTES, UNCOMPLICATED: ICD-10-CM
